# Patient Record
Sex: FEMALE | Race: OTHER | NOT HISPANIC OR LATINO
[De-identification: names, ages, dates, MRNs, and addresses within clinical notes are randomized per-mention and may not be internally consistent; named-entity substitution may affect disease eponyms.]

---

## 2018-10-15 ENCOUNTER — APPOINTMENT (OUTPATIENT)
Dept: SURGERY | Facility: CLINIC | Age: 78
End: 2018-10-15
Payer: MEDICARE

## 2018-10-15 VITALS
WEIGHT: 150 LBS | DIASTOLIC BLOOD PRESSURE: 72 MMHG | BODY MASS INDEX: 24.11 KG/M2 | SYSTOLIC BLOOD PRESSURE: 120 MMHG | HEART RATE: 44 BPM | TEMPERATURE: 98.7 F | HEIGHT: 66 IN

## 2018-10-15 PROBLEM — Z00.00 ENCOUNTER FOR PREVENTIVE HEALTH EXAMINATION: Status: ACTIVE | Noted: 2018-10-15

## 2018-10-15 PROCEDURE — 99203 OFFICE O/P NEW LOW 30 MIN: CPT

## 2018-10-31 ENCOUNTER — OUTPATIENT (OUTPATIENT)
Dept: OUTPATIENT SERVICES | Facility: HOSPITAL | Age: 78
LOS: 1 days | End: 2018-10-31
Payer: MEDICARE

## 2018-10-31 VITALS
WEIGHT: 151.9 LBS | TEMPERATURE: 98 F | HEART RATE: 57 BPM | DIASTOLIC BLOOD PRESSURE: 59 MMHG | OXYGEN SATURATION: 97 % | HEIGHT: 66 IN | RESPIRATION RATE: 16 BRPM | SYSTOLIC BLOOD PRESSURE: 145 MMHG

## 2018-10-31 DIAGNOSIS — Z98.890 OTHER SPECIFIED POSTPROCEDURAL STATES: Chronic | ICD-10-CM

## 2018-10-31 DIAGNOSIS — D36.7 BENIGN NEOPLASM OF OTHER SPECIFIED SITES: ICD-10-CM

## 2018-10-31 DIAGNOSIS — K21.9 GASTRO-ESOPHAGEAL REFLUX DISEASE WITHOUT ESOPHAGITIS: ICD-10-CM

## 2018-10-31 DIAGNOSIS — K80.20 CALCULUS OF GALLBLADDER WITHOUT CHOLECYSTITIS WITHOUT OBSTRUCTION: Chronic | ICD-10-CM

## 2018-10-31 DIAGNOSIS — Z01.818 ENCOUNTER FOR OTHER PREPROCEDURAL EXAMINATION: ICD-10-CM

## 2018-10-31 DIAGNOSIS — I10 ESSENTIAL (PRIMARY) HYPERTENSION: ICD-10-CM

## 2018-10-31 PROCEDURE — G0463: CPT

## 2018-10-31 NOTE — H&P PST ADULT - RS GEN PE MLT RESP DETAILS PC
no chest wall tenderness/no intercostal retractions/no rhonchi/breath sounds equal/airway patent/no rales/good air movement/no subcutaneous emphysema/no wheezes/respirations non-labored/normal/clear to auscultation bilaterally

## 2018-10-31 NOTE — H&P PST ADULT - NEGATIVE GASTROINTESTINAL SYMPTOMS
no diarrhea/no constipation/no nausea/no vomiting/no melena/no flatulence/no abdominal pain/no change in bowel habits

## 2018-10-31 NOTE — H&P PST ADULT - NEGATIVE GENERAL SYMPTOMS
no fever/no anorexia/no sweating/no polyphagia/no fatigue/no malaise/no chills/no weight gain/no polyuria/no weight loss/no polydipsia

## 2018-10-31 NOTE — H&P PST ADULT - PMH
Benign neoplasm of other specified sites    Diabetes mellitus, type 2    Essential hypertension    Hyperlipidemia, unspecified hyperlipidemia type    Memory loss  associated with early dementia, alzheimer's  Osteoarthritis of multiple joints, unspecified osteoarthritis type    Seasonal allergic rhinitis, unspecified trigger

## 2018-10-31 NOTE — H&P PST ADULT - HISTORY OF PRESENT ILLNESS
77 y/o female with PMH of HTN, HLD, T2DM, GERD, memory loss, seasonal allergic rhinitis presents to PST for presurgical evaluation. Complains of left inner thigh mass causing her difficulty walking. She was diagnosed with benign neoplasm and is scheduled for excision of left inner thigh mass 11/14/2018

## 2018-10-31 NOTE — H&P PST ADULT - NEGATIVE CARDIOVASCULAR SYMPTOMS
no peripheral edema/no dyspnea on exertion/no palpitations/no claudication/no orthopnea/no paroxysmal nocturnal dyspnea/no chest pain

## 2018-10-31 NOTE — H&P PST ADULT - NSANTHOSAYNRD_GEN_A_CORE
No. CONCEPCION screening performed.  STOP BANG Legend: 0-2 = LOW Risk; 3-4 = INTERMEDIATE Risk; 5-8 = HIGH Risk

## 2018-10-31 NOTE — H&P PST ADULT - PSH
Calculus of gallbladder without cholecystitis without obstruction  s/o cholecystectomy  History of lumpectomy of right breast    History of varicose vein stripping  2016

## 2018-10-31 NOTE — H&P PST ADULT - ASSESSMENT
77 y/o female with PMH of HTN, HLD, T2DM, GERD, memory loss, seasonal allergic rhinitis diagnosed with benign neoplasm scheduled for excision of left inner thigh mass 11/14/2018

## 2018-10-31 NOTE — H&P PST ADULT - NEGATIVE ENMT SYMPTOMS
no vertigo/no tinnitus/no sinus symptoms/no nasal congestion/no nasal discharge/no ear pain/no hearing difficulty

## 2018-11-05 PROBLEM — R41.3 OTHER AMNESIA: Chronic | Status: ACTIVE | Noted: 2018-10-31

## 2018-11-05 PROBLEM — E11.9 TYPE 2 DIABETES MELLITUS WITHOUT COMPLICATIONS: Chronic | Status: ACTIVE | Noted: 2018-10-31

## 2018-11-05 PROBLEM — J30.2 OTHER SEASONAL ALLERGIC RHINITIS: Chronic | Status: ACTIVE | Noted: 2018-10-31

## 2018-11-05 PROBLEM — M15.9 POLYOSTEOARTHRITIS, UNSPECIFIED: Chronic | Status: ACTIVE | Noted: 2018-10-31

## 2018-11-05 PROBLEM — D36.7 BENIGN NEOPLASM OF OTHER SPECIFIED SITES: Chronic | Status: ACTIVE | Noted: 2018-10-31

## 2018-11-05 PROBLEM — E78.5 HYPERLIPIDEMIA, UNSPECIFIED: Chronic | Status: ACTIVE | Noted: 2018-10-31

## 2018-11-05 PROBLEM — I10 ESSENTIAL (PRIMARY) HYPERTENSION: Chronic | Status: ACTIVE | Noted: 2018-10-31

## 2018-11-13 ENCOUNTER — TRANSCRIPTION ENCOUNTER (OUTPATIENT)
Age: 78
End: 2018-11-13

## 2018-11-14 ENCOUNTER — OUTPATIENT (OUTPATIENT)
Dept: OUTPATIENT SERVICES | Facility: HOSPITAL | Age: 78
LOS: 1 days | End: 2018-11-14
Payer: MEDICARE

## 2018-11-14 ENCOUNTER — APPOINTMENT (OUTPATIENT)
Dept: SURGERY | Facility: HOSPITAL | Age: 78
End: 2018-11-14

## 2018-11-14 ENCOUNTER — RESULT REVIEW (OUTPATIENT)
Age: 78
End: 2018-11-14

## 2018-11-14 VITALS
RESPIRATION RATE: 16 BRPM | HEIGHT: 66 IN | TEMPERATURE: 98 F | SYSTOLIC BLOOD PRESSURE: 145 MMHG | OXYGEN SATURATION: 98 % | DIASTOLIC BLOOD PRESSURE: 83 MMHG | HEART RATE: 56 BPM | WEIGHT: 151.9 LBS

## 2018-11-14 VITALS
OXYGEN SATURATION: 99 % | DIASTOLIC BLOOD PRESSURE: 62 MMHG | RESPIRATION RATE: 16 BRPM | SYSTOLIC BLOOD PRESSURE: 126 MMHG | HEART RATE: 62 BPM | TEMPERATURE: 98 F

## 2018-11-14 DIAGNOSIS — Z98.890 OTHER SPECIFIED POSTPROCEDURAL STATES: Chronic | ICD-10-CM

## 2018-11-14 DIAGNOSIS — D36.7 BENIGN NEOPLASM OF OTHER SPECIFIED SITES: ICD-10-CM

## 2018-11-14 DIAGNOSIS — Z01.818 ENCOUNTER FOR OTHER PREPROCEDURAL EXAMINATION: ICD-10-CM

## 2018-11-14 DIAGNOSIS — K80.20 CALCULUS OF GALLBLADDER WITHOUT CHOLECYSTITIS WITHOUT OBSTRUCTION: Chronic | ICD-10-CM

## 2018-11-14 LAB
GLUCOSE BLDC GLUCOMTR-MCNC: 103 MG/DL — HIGH (ref 70–99)
GLUCOSE BLDC GLUCOMTR-MCNC: 71 MG/DL — SIGNIFICANT CHANGE UP (ref 70–99)

## 2018-11-14 PROCEDURE — 88304 TISSUE EXAM BY PATHOLOGIST: CPT | Mod: 26

## 2018-11-14 PROCEDURE — 82962 GLUCOSE BLOOD TEST: CPT

## 2018-11-14 PROCEDURE — 27337 EXC THIGH/KNEE LES SC 3 CM/>: CPT | Mod: AS

## 2018-11-14 PROCEDURE — 27337 EXC THIGH/KNEE LES SC 3 CM/>: CPT | Mod: LT

## 2018-11-14 PROCEDURE — 88304 TISSUE EXAM BY PATHOLOGIST: CPT

## 2018-11-14 PROCEDURE — 27227 TREAT HIP FRACTURE(S): CPT | Mod: LT

## 2018-11-14 RX ORDER — SODIUM CHLORIDE 9 MG/ML
1000 INJECTION INTRAMUSCULAR; INTRAVENOUS; SUBCUTANEOUS
Qty: 0 | Refills: 0 | Status: DISCONTINUED | OUTPATIENT
Start: 2018-11-14 | End: 2018-11-14

## 2018-11-14 RX ORDER — LOSARTAN/HYDROCHLOROTHIAZIDE 100MG-25MG
1 TABLET ORAL
Qty: 0 | Refills: 0 | COMMUNITY

## 2018-11-14 RX ORDER — SITAGLIPTIN 50 MG/1
1 TABLET, FILM COATED ORAL
Qty: 0 | Refills: 0 | COMMUNITY

## 2018-11-14 RX ORDER — ONDANSETRON 8 MG/1
4 TABLET, FILM COATED ORAL ONCE
Qty: 0 | Refills: 0 | Status: DISCONTINUED | OUTPATIENT
Start: 2018-11-14 | End: 2018-11-14

## 2018-11-14 RX ORDER — IBUPROFEN 200 MG
600 TABLET ORAL EVERY 6 HOURS
Qty: 0 | Refills: 0 | Status: DISCONTINUED | OUTPATIENT
Start: 2018-11-14 | End: 2018-11-22

## 2018-11-14 RX ORDER — SODIUM CHLORIDE 9 MG/ML
3 INJECTION INTRAMUSCULAR; INTRAVENOUS; SUBCUTANEOUS EVERY 8 HOURS
Qty: 0 | Refills: 0 | Status: DISCONTINUED | OUTPATIENT
Start: 2018-11-14 | End: 2018-11-14

## 2018-11-14 RX ORDER — RIVASTIGMINE 4.6 MG/24H
1 PATCH, EXTENDED RELEASE TRANSDERMAL
Qty: 0 | Refills: 0 | COMMUNITY

## 2018-11-14 RX ORDER — ASPIRIN/CALCIUM CARB/MAGNESIUM 324 MG
1 TABLET ORAL
Qty: 0 | Refills: 0 | COMMUNITY

## 2018-11-14 RX ORDER — HYDROMORPHONE HYDROCHLORIDE 2 MG/ML
0.5 INJECTION INTRAMUSCULAR; INTRAVENOUS; SUBCUTANEOUS
Qty: 0 | Refills: 0 | Status: DISCONTINUED | OUTPATIENT
Start: 2018-11-14 | End: 2018-11-14

## 2018-11-14 RX ORDER — ATENOLOL 25 MG/1
1 TABLET ORAL
Qty: 0 | Refills: 0 | COMMUNITY

## 2018-11-14 RX ORDER — ACETAMINOPHEN 500 MG
2 TABLET ORAL
Qty: 0 | Refills: 0 | COMMUNITY

## 2018-11-14 RX ORDER — EZETIMIBE 10 MG/1
1 TABLET ORAL
Qty: 0 | Refills: 0 | COMMUNITY

## 2018-11-14 RX ORDER — MONTELUKAST 4 MG/1
1 TABLET, CHEWABLE ORAL
Qty: 0 | Refills: 0 | COMMUNITY

## 2018-11-14 RX ORDER — METFORMIN HYDROCHLORIDE 850 MG/1
1 TABLET ORAL
Qty: 0 | Refills: 0 | COMMUNITY

## 2018-11-14 RX ORDER — SIMVASTATIN 20 MG/1
1 TABLET, FILM COATED ORAL
Qty: 0 | Refills: 0 | COMMUNITY

## 2018-11-14 RX ORDER — AMLODIPINE BESYLATE 2.5 MG/1
1 TABLET ORAL
Qty: 0 | Refills: 0 | COMMUNITY

## 2018-11-14 RX ORDER — OMEPRAZOLE 10 MG/1
1 CAPSULE, DELAYED RELEASE ORAL
Qty: 0 | Refills: 0 | COMMUNITY

## 2018-11-14 RX ADMIN — SODIUM CHLORIDE 3 MILLILITER(S): 9 INJECTION INTRAMUSCULAR; INTRAVENOUS; SUBCUTANEOUS at 13:44

## 2018-11-14 NOTE — BRIEF OPERATIVE NOTE - PROCEDURE
<<-----Click on this checkbox to enter Procedure Excision of mass of left thigh  11/14/2018    Active  CANDACE

## 2018-11-14 NOTE — ASU PREOP CHECKLIST - TO WHOM
Report given to OR Rn @  AM Report given to OR Pauline Villarreal @2:45 PM also informed that attestation was not signed. Dr. Hansen should return and sign the attestation.

## 2018-11-14 NOTE — ASU DISCHARGE PLAN (ADULT/PEDIATRIC). - MEDICATION SUMMARY - MEDICATIONS TO TAKE
I will START or STAY ON the medications listed below when I get home from the hospital:    Aspir 81 oral delayed release tablet  -- 1 tab(s) by mouth once a day  -- Indication: For Prophylaxis    Tylenol 500 mg oral tablet  -- 2 tab(s) by mouth every 6 hours, As Needed  -- Indication: For Moderate pain    Januvia 100 mg oral tablet  -- 1 tab(s) by mouth once a day  -- Indication: For DM    metFORMIN 1000 mg oral tablet  -- 1 tab(s) by mouth 2 times a day  -- Indication: For DM    simvastatin 40 mg oral tablet  -- 1 tab(s) by mouth once a day (at bedtime)  -- Indication: For Hyperlipidemia    ezetimibe 10 mg oral tablet  -- 1 tab(s) by mouth once a day  -- Indication: For Hyperlipidemia    losartan-hydroCHLOROthiazide 100 mg-25 mg oral tablet  -- 1 tab(s) by mouth once a day  -- Indication: For HTN    atenolol 50 mg oral tablet  -- 1 tab(s) by mouth once a day  -- Indication: For HTN    amLODIPine 10 mg oral tablet  -- 1 tab(s) by mouth once a day  -- Indication: For HTN    rivastigmine 4.5 mg oral capsule  -- 1 cap(s) by mouth 2 times a day  -- Indication: For Dementia    montelukast 10 mg oral tablet  -- 1 tab(s) by mouth once a day  -- Indication: For Asthma    omeprazole 40 mg oral delayed release capsule  -- 1 cap(s) by mouth once a day  -- Indication: For GERD

## 2018-11-14 NOTE — ASU DISCHARGE PLAN (ADULT/PEDIATRIC). - NOTIFY
Fever greater than 101/Bleeding that does not stop/Swelling that continues Pain not relieved by Medications/Fever greater than 101/Bleeding that does not stop/Swelling that continues

## 2018-11-16 LAB — SURGICAL PATHOLOGY STUDY: SIGNIFICANT CHANGE UP

## 2018-11-27 PROBLEM — Z87.39 HISTORY OF ARTHRITIS: Status: RESOLVED | Noted: 2018-10-15 | Resolved: 2018-11-27

## 2018-11-27 PROBLEM — Z86.79 HISTORY OF HYPERTENSION: Status: RESOLVED | Noted: 2018-10-15 | Resolved: 2018-11-27

## 2018-11-27 PROBLEM — Z82.49 FAMILY HISTORY OF HYPERTENSION: Status: ACTIVE | Noted: 2018-10-15

## 2018-11-27 PROBLEM — R22.42 MASS OF LEFT THIGH: Status: ACTIVE | Noted: 2018-11-27

## 2018-11-27 PROBLEM — Z86.39 HISTORY OF HIGH CHOLESTEROL: Status: RESOLVED | Noted: 2018-10-15 | Resolved: 2018-11-27

## 2018-11-27 PROBLEM — Z86.39 HISTORY OF DIABETES MELLITUS: Status: RESOLVED | Noted: 2018-10-15 | Resolved: 2018-11-27

## 2018-11-29 ENCOUNTER — APPOINTMENT (OUTPATIENT)
Dept: SURGERY | Facility: CLINIC | Age: 78
End: 2018-11-29
Payer: MEDICARE

## 2018-11-29 DIAGNOSIS — Z86.79 PERSONAL HISTORY OF OTHER DISEASES OF THE CIRCULATORY SYSTEM: ICD-10-CM

## 2018-11-29 DIAGNOSIS — R22.42 LOCALIZED SWELLING, MASS AND LUMP, LEFT LOWER LIMB: ICD-10-CM

## 2018-11-29 DIAGNOSIS — Z86.39 PERSONAL HISTORY OF OTHER ENDOCRINE, NUTRITIONAL AND METABOLIC DISEASE: ICD-10-CM

## 2018-11-29 DIAGNOSIS — Z87.39 PERSONAL HISTORY OF OTHER DISEASES OF THE MUSCULOSKELETAL SYSTEM AND CONNECTIVE TISSUE: ICD-10-CM

## 2018-11-29 DIAGNOSIS — Z82.49 FAMILY HISTORY OF ISCHEMIC HEART DISEASE AND OTHER DISEASES OF THE CIRCULATORY SYSTEM: ICD-10-CM

## 2018-11-29 PROCEDURE — 99024 POSTOP FOLLOW-UP VISIT: CPT

## 2018-11-29 RX ORDER — SITAGLIPTIN 100 MG/1
TABLET, FILM COATED ORAL
Refills: 0 | Status: ACTIVE | COMMUNITY

## 2018-11-29 RX ORDER — EZETIMIBE 10 MG/1
10 TABLET ORAL
Refills: 0 | Status: ACTIVE | COMMUNITY

## 2018-11-29 RX ORDER — ATENOLOL 50 MG/1
TABLET ORAL
Refills: 0 | Status: ACTIVE | COMMUNITY

## 2018-11-29 RX ORDER — MONTELUKAST 10 MG/1
TABLET, FILM COATED ORAL
Refills: 0 | Status: ACTIVE | COMMUNITY

## 2018-11-29 RX ORDER — OMEPRAZOLE 20 MG/1
TABLET, DELAYED RELEASE ORAL
Refills: 0 | Status: ACTIVE | COMMUNITY

## 2018-11-29 RX ORDER — SIMVASTATIN 80 MG/1
TABLET, FILM COATED ORAL
Refills: 0 | Status: ACTIVE | COMMUNITY

## 2018-11-29 RX ORDER — METFORMIN HYDROCHLORIDE 625 MG/1
TABLET ORAL
Refills: 0 | Status: ACTIVE | COMMUNITY

## 2018-11-29 RX ORDER — LOSARTAN POTASSIUM 100 MG/1
TABLET, FILM COATED ORAL
Refills: 0 | Status: ACTIVE | COMMUNITY

## 2018-11-29 RX ORDER — AMLODIPINE BESYLATE 5 MG/1
TABLET ORAL
Refills: 0 | Status: ACTIVE | COMMUNITY

## 2019-06-24 NOTE — ASU DISCHARGE PLAN (ADULT/PEDIATRIC). - YOU WERE IN THE HOSPITAL FOR:
Submitted PA for Farxiga 5MG tablets, Key: [id number]. Drug is covered by current benefit plan. No further PA activity needed. Please advise patient. Thank you. elective excision of posterior left back mass

## 2021-05-21 ENCOUNTER — RESULT REVIEW (OUTPATIENT)
Age: 81
End: 2021-05-21

## 2022-04-13 NOTE — ASU PREOP CHECKLIST - HEIGHT IN INCHES
DOS: 4/13/22    Pt seen in consultation for Dante Kumar, presents with chronic nasal obstruction, has seen Allergy, on CPAP, previous CT sinus negative. Still nasal obstruction and mouth breathing. No asthma or DM. + dust/irritant/cigarette smoke exposure, + pet exposure, + family Hx similar problems. No other aggravating or alleviating factors noted.    Past medical, family, and social history reviewed per electronic health record on today's date of service, and there were no changes.    Review of systems including hematologic/lymphatic, respiratory, cardiovascular, constitutional, musculoskeletal, mood, endocrine, eyes, gastrointestinal, genitourinary, integumentary, and neurological systems was negative.    4/13/2022    Vitals:    04/13/22 1447   BP: (!) 140/86   Pulse: 88   Resp: 16       On exam, patient is alert, oriented x3, well-developed, well-nourished, in no apparent distress.  Patient is attentive and responds to questions appropriately.  Voice is not hoarse.    Overall appearance of the head and neck is normal.  Facial symmetry and movement are within normal limits bilaterally, and skin is warm and dry.    Extraocular movement is preserved.    Ears:  Normal pinnae, ear canals, and tympanic membranes bilaterally.  Normal motion of the TM (tympanic membrane) on pneumatic otoscopy.  No TMJ (temporomandibular joint) tenderness.  No mastoid process tenderness.  Speech reception within normal limits.    Nose:  Severe R deviated septum, normal bridge, and bilat inflamed nasal mucosa with turbinate hypertrophy.  No sinus tenderness on percussion.    Oral cavity:  Normal lips, gums, floor of mouth, buccal mucosa, tongue, and hard palate.    Oropharynx:  + papillomatous growth L uvula, normal posterior pharyngeal wall, tonsillar fossae and tonsils.    Neck:  No abnormal masses, thyroid masses or enlargement, lymphadenopathy, crepitus or tenderness.  Trachea midline.    Chest:  No respiratory distress, stridor,  or wheezing.    Cardiovascular:  No peripheral edema, normal peripheral perfusion, no pallor.    Neurologic:  Cranial nerve function grossly within normal limits.    Assessment and Plan: Based on the patient's history and physical, I would recommend a septoplasty and bilateral inferior turbinate reduction as well as uvulectomy. Risks of bleeding and infection were explained, and consent obtained. I will book this for the near future, and will consult with the patient's primary MD for preoperative clearance.     6

## 2023-09-08 NOTE — H&P PST ADULT - PSYCHIATRIC
Patient report given to Gail Range. Patient continues prone, resting without distress at present.  Care transferred details… detailed exam

## 2023-11-15 NOTE — ASU PREOP CHECKLIST - SKIN PREP
Writer notified patient Dr Patel received the pathology result on the polyp removed.  The polyp shows to be a hyperplastic polyp, which was removed.  Dr Patel would like patient to repeat his colonoscopy in 5 years.  Patient states he understands the information given and does not have any questions at this time. Patient will call the office if he has any concerns.      Patient does have a question regarding a referral to have his hemorrhoids taken care of.  Patient is wondering if the referral was sent by Dr Patel.  Writer did let patient know that a message will be sent to Dr Patel and he will receive a call back regarding his question.   done